# Patient Record
Sex: MALE | Race: OTHER | NOT HISPANIC OR LATINO | URBAN - METROPOLITAN AREA
[De-identification: names, ages, dates, MRNs, and addresses within clinical notes are randomized per-mention and may not be internally consistent; named-entity substitution may affect disease eponyms.]

---

## 2020-02-21 ENCOUNTER — EMERGENCY (EMERGENCY)
Facility: HOSPITAL | Age: 25
LOS: 1 days | Discharge: HOME | End: 2020-02-21
Attending: EMERGENCY MEDICINE
Payer: MEDICAID

## 2020-02-21 VITALS
RESPIRATION RATE: 16 BRPM | TEMPERATURE: 98 F | HEART RATE: 84 BPM | SYSTOLIC BLOOD PRESSURE: 143 MMHG | OXYGEN SATURATION: 98 % | DIASTOLIC BLOOD PRESSURE: 79 MMHG

## 2020-02-21 VITALS
HEIGHT: 68 IN | DIASTOLIC BLOOD PRESSURE: 75 MMHG | TEMPERATURE: 98 F | OXYGEN SATURATION: 98 % | WEIGHT: 179.9 LBS | SYSTOLIC BLOOD PRESSURE: 125 MMHG | RESPIRATION RATE: 18 BRPM | HEART RATE: 76 BPM

## 2020-02-21 PROCEDURE — 73590 X-RAY EXAM OF LOWER LEG: CPT

## 2020-02-21 PROCEDURE — 76377 3D RENDER W/INTRP POSTPROCES: CPT

## 2020-02-21 PROCEDURE — 73562 X-RAY EXAM OF KNEE 3: CPT | Mod: 26,LT

## 2020-02-21 PROCEDURE — 73590 X-RAY EXAM OF LOWER LEG: CPT | Mod: 26,LT

## 2020-02-21 PROCEDURE — 73700 CT LOWER EXTREMITY W/O DYE: CPT | Mod: 26,LT

## 2020-02-21 PROCEDURE — 73562 X-RAY EXAM OF KNEE 3: CPT

## 2020-02-21 PROCEDURE — 99285 EMERGENCY DEPT VISIT HI MDM: CPT

## 2020-02-21 PROCEDURE — 73700 CT LOWER EXTREMITY W/O DYE: CPT

## 2020-02-21 PROCEDURE — 99284 EMERGENCY DEPT VISIT MOD MDM: CPT | Mod: 25

## 2020-02-21 PROCEDURE — 76377 3D RENDER W/INTRP POSTPROCES: CPT | Mod: 26

## 2020-02-21 RX ORDER — ACETAMINOPHEN 500 MG
975 TABLET ORAL ONCE
Refills: 0 | Status: COMPLETED | OUTPATIENT
Start: 2020-02-21 | End: 2020-02-21

## 2020-02-21 RX ORDER — DIAZEPAM 5 MG
5 TABLET ORAL ONCE
Refills: 0 | Status: DISCONTINUED | OUTPATIENT
Start: 2020-02-21 | End: 2020-02-21

## 2020-02-21 RX ORDER — IBUPROFEN 200 MG
600 TABLET ORAL ONCE
Refills: 0 | Status: COMPLETED | OUTPATIENT
Start: 2020-02-21 | End: 2020-02-21

## 2020-02-21 RX ADMIN — Medication 5 MILLIGRAM(S): at 18:04

## 2020-02-21 RX ADMIN — Medication 600 MILLIGRAM(S): at 17:02

## 2020-02-21 RX ADMIN — Medication 975 MILLIGRAM(S): at 18:03

## 2020-02-21 RX ADMIN — Medication 600 MILLIGRAM(S): at 18:03

## 2020-02-21 RX ADMIN — Medication 975 MILLIGRAM(S): at 17:01

## 2020-02-21 NOTE — ED PROVIDER NOTE - PATIENT PORTAL LINK FT
You can access the FollowMyHealth Patient Portal offered by [facility] by registering at the following website: [email or web address]. By joining [facility]’s FollowMyHealth portal, you will also be able to view your health information using other applications (apps) compatible with our system.

## 2020-02-21 NOTE — ED PROVIDER NOTE - PHYSICAL EXAMINATION
NAD, VSS, Afebrile, No spinal tender. Lungs clear. ABD soft, non tender. No pelvic or hip tender. + Left knee generalized tender with mild swelling, diminished ROMs and unable to extend LLE against gravity. N/V- intact.

## 2020-02-21 NOTE — ED PROVIDER NOTE - NSFOLLOWUPCLINICS_GEN_ALL_ED_FT
[facility] Orthopedic Surgery  Orthopedic Surgery  [address], [address]rd & [address]th [place] [address]  Phone: [phone]  Fax:   Follow Up Time:

## 2020-02-21 NOTE — ED PROVIDER NOTE - ATTENDING CONTRIBUTION TO CARE
------------ATTENDING NOTE------------   pt w/ mother/partner c/o accidental twisting R knee yesterday playing basketball, describing hearing a "pop" and sudden moderate dull ache in R knee gradually worsening over time, now w/ spams ache in hamstring, pain worse w/ walking, no additional injuries/complaints, nvi w/ bcr distally, soft compartments, FROM w/o laxity, pt/mother very anxious about ACL injury (demanding MRI), multiple in depth dw all about ED course/goals and expected management, nml VS at dc, in depth dw all about ddx, tx, wolf, continued close outpt fu.  - Mukul Munoz MD   ------------------------------------------------- ------------ATTENDING NOTE------------   pt w/ mother/partner c/o accidental twisting R knee yesterday playing basketball, describing hearing a "pop" and sudden moderate dull ache in R knee gradually worsening over time, now w/ spams ache in hamstring, pain worse w/ walking, no additional injuries/complaints, nvi w/ bcr distally, soft compartments, FROM w/o laxity, pt/mother very anxious about ACL injury (demanding MRI), multiple in depth dw all about ED course/goals and expected management --> after CT to eval for occult fx was negative, pt/family then described having and MRI already done in [place] showing a "femoral condyle fracture and near complete ACL tear" but they are unable to get results, d/w ortho resident and will knee immobilize and f/u Ortho Clinic, nml VS at [place], in depth dw all about ddx, tx, wolf, continued close outpt fu.  - Mukul Munoz MD   -------------------------------------------------

## 2020-02-21 NOTE — ED PROCEDURE NOTE - CPROC ED POST PROC CARE GUIDE1
Instructed patient/caregiver to follow-up with primary care physician./Elevate the injured extremity as instructed./Instructed patient/caregiver regarding signs and symptoms of infection./Keep the cast/splint/dressing clean and dry./Verbal/written post procedure instructions were given to patient/caregiver.

## 2020-02-21 NOTE — ED ADULT NURSE REASSESSMENT NOTE - NS ED NURSE REASSESS COMMENT FT1
Pt taught how to use crutches and teach back method used. Pt verbalizes feeling comfortable with using crutches independently. Pt ambulates independently with crutches.

## 2020-02-21 NOTE — ED ADULT NURSE NOTE - OBJECTIVE STATEMENT
c/o left knee injury x 1 day. Pt states, "I was playing basketball yesterday, I jumped and landed on my left leg and fell and hurt my left knee. It was swollen, I slept on it and when I woke up today, I couldn't walk on it. I cant bend my leg or bear weight on my left foot. No PMH, PSH NKA. c/o left knee injury x 1 day. Pt states, "I was playing basketball yesterday, I jumped and landed on my left leg and fell and hurt my left knee. I heard something pop. It was swollen, I slept on it and when I woke up today, I couldn't walk on it. I cant bend my leg or bear weight on my left foot. I have to hop around. The pain is so bad" Pt can not move his left leg. Pt experiences pain when trying to wiggle his toes on left foot. Knee is very painful to light touch. No PMH, PSH NKA.

## 2020-02-21 NOTE — ED PROVIDER NOTE - CARE PLAN
Principal Discharge DX:	Sprain of other ligament of right knee, initial encounter  Secondary Diagnosis:	Anxiety about health

## 2020-02-21 NOTE — ED PROVIDER NOTE - NSFOLLOWUPINSTRUCTIONS_ED_ALL_ED_FT
See Sports Medicine Clinic in next week for follow up -- call to discuss.    Acetaminophen and/or Ibuprofen as directed for pain -- see medication warnings.    Use ACE WRAP and CRUTCHES as directed.    See KNEE INJURY information and return instructions given to you.    Seek immediate medical care for new/worsening symptoms/concerns. See ORTHOPEDIC CLINIC in next week for follow up -- call to discuss.    Acetaminophen and/or Ibuprofen as directed for pain -- see medication warnings.    Use KNEE IMMOBILIZER and CRUTCHES as directed.    See KNEE INJURY information and return instructions given to you.    Seek immediate medical care for new/worsening symptoms/concerns.